# Patient Record
Sex: FEMALE | Race: AMERICAN INDIAN OR ALASKA NATIVE
[De-identification: names, ages, dates, MRNs, and addresses within clinical notes are randomized per-mention and may not be internally consistent; named-entity substitution may affect disease eponyms.]

---

## 2020-03-28 ENCOUNTER — HOSPITAL ENCOUNTER (EMERGENCY)
Dept: HOSPITAL 5 - ED | Age: 35
Discharge: HOME | End: 2020-03-28
Payer: SELF-PAY

## 2020-03-28 VITALS — SYSTOLIC BLOOD PRESSURE: 124 MMHG | DIASTOLIC BLOOD PRESSURE: 77 MMHG

## 2020-03-28 DIAGNOSIS — Z98.890: ICD-10-CM

## 2020-03-28 DIAGNOSIS — F41.0: ICD-10-CM

## 2020-03-28 DIAGNOSIS — F41.9: Primary | ICD-10-CM

## 2020-03-28 PROCEDURE — 99283 EMERGENCY DEPT VISIT LOW MDM: CPT

## 2020-03-28 NOTE — EMERGENCY DEPARTMENT REPORT
Chief Complaint: Anxiety


Stated Complaint: ANXIETY ATTACK


Time Seen by Provider: 20 18:22





- HPI


History of Present Illness: 





34-year-old -American female presents to the emergency room brought in by

[facility] reporting that she had a panic attack.  Patient states that 

she suffers some anxiety and what is set her office had a brother  yesterday

in [place] and her boyfriend broke up with her.  Patient denies any suicidal 

homicidal ideation.  Patient denies any chest pain shortness of breathing 

headache.





- Exam


Vital Signs: 


                                   Vital Signs











  20





  17:48


 


Temperature 98.6 F


 


Pulse Rate 97 H


 


Respiratory 18





Rate 


 


Blood Pressure 124/77


 


O2 Sat by Pulse 98





Oximetry 











Physical Exam: 





Gen: alert oriented NAD patient is tearful





Cardic: regular rate and rhythm no murmurs appreciated





Resp: Clear to auscultation bilateral no wheezing no rales or rhonchi.











MSE screening note: 


Focused history and physical exam performed.


Due to findings the following was ordered:





34-year-old -American female presents to the emergency room brought in by

[facility] reporting that she had a panic attack.  Patient states that 

she suffers some anxiety and what is set her office had a brother  yesterday

in [place] and her boyfriend broke up with her.  Patient denies any suicidal 

homicidal ideation. Patient denies any chest pain shortness of breathing 

headache.








Patient will be given Atarax 25 mg p.o.  Patient will be referred to [facility].





ED Medical Decision Making





- Medical Decision Making





34-year-old -American female presents to the emergency room brought in by

[facility] reporting that she had a panic attack.  Patient states that 

she suffers some anxiety and what is set her office had a brother  yesterday

in [place] and her boyfriend broke up with her.  Patient denies any suicidal 

homicidal ideation.








Patient will be given referral to [facility].  Patient was given 

Atarax p.o. now 25 mg.





ED Disposition for MSE


Clinical Impression: 


 Anxiety, Panic attack





Disposition: DC-01 TO HOME OR SELFCARE


Is pt being admited?: No


Does the pt Need Aspirin: No


Condition: Stable


Instructions:  Anxiety (ED)


Additional Instructions: 


Please follow-up with her mental health provider or primary care provider.


Referrals: 


[facility] [Outside] - 3-5 Days


[facility]